# Patient Record
(demographics unavailable — no encounter records)

---

## 2025-03-31 NOTE — PHYSICAL EXAM
[Obese, well nourished, in no acute distress] : obese, well nourished, in no acute distress [Normal] : affect appropriate [de-identified] : Anicteric, no conjunctival injection or drainage noted [de-identified] : Supple, no obvious palpable masses or lymphadenopathy noted [de-identified] : Nonlabored respirations, equal chest rise, no audible wheezing [de-identified] : Regular rate and rhythm [de-identified] : Soft, obese, nontender. [de-identified] : No obvious deformity, normal gait

## 2025-03-31 NOTE — ASSESSMENT
[FreeTextEntry1] : DEBRA LEWIS is a 45 year-old F with a long-standing h/o obesity, who presents today for initial evaluation for weight management options.   Had a lengthy discussion with the patient regarding nutrition, exercise, and weight loss medications. The pt is most interested in weight loss medications at this time.   Health maintenance and behavioral/nutrition counseling for obesity: An additional 30 minutes of the visit was spent counseling the patient regarding need for aggressive weight loss and behavior modification. Patient educated regarding proper nutrition, and how to modify current eating habits noted in HPI. Patient also educated regarding exercise including suggestions of different exercises to try. All questions answered.   Patient will work on the following:  -Eliminate snacks  -Focus on eating 3 well-balanced meals during the daytime with appropriate portion size  -Cooking fresh meals rather than take out/processed/ready-made foods  -Protein focused meals, plenty of vegetables and complex carbohydrates  -Avoiding fried/fatty foods and foods containing high sugar content  -Incorporating exercise (aim for 150 mins/week with both cardio- and strength-training)   ***FEMALES ONLY***  Discussed with the pt of the warnings of pregnancy while on Zepbound:  - instructed pt to avoid planned pregnancy and use contraception; the pt is currently using: Vasectomy  - advised pt to stop Zepbound immediately if becomes pregnant  Pt verbalized understanding of the above.   Start Zepbound based on labs and authorization. Currently there are no contraindications for the use of GLP-1 agonists after reviewing the pt's medical history and labs, including no personal and family history of thyroid cancer or MEN Syndrome. Possible side effects of GLP-1 agonist were discussed with the patient, including nausea, abdominal pain, reflux, constipation, delayed gastric emptying, gallstones, kidney stones, visual changes, and pancreatitis. Pt verbalizes understanding and wishes to proceed with medical therapy. Instructions for use provided. Pt understands the need for long-term use of weight loss medications and understands the risk of weight-gain upon stopping.  Nutrition consult Patient educated to call with questions/concerns All questions answered Return to office 3 weeks after starting Zepbound; call the office right away with any side effects    Additional time spent before and after visit reviewing chart.

## 2025-03-31 NOTE — HISTORY OF PRESENT ILLNESS
[de-identified] : DEBRA LEWIS is a 45 year old F with a long-standing Hx of obesity presents today for initial evaluation for weight management options.   Heaviest/current wt 230/216 lbs, lowest wt 140 lbs. Goal weight: 150 lbs Diets/exercise programs tried in the past: yes Weight loss medications tried in the past: no Reason for stopping weight loss medication if applicable: n/a    History of bariatric surgery: no Obesity comorbidities: HTN, asthma, anxiety/depression Comorbidities improved/resolved: n/a Anti-obesity medication: none Obesity medication side effects: n/a   Reports no personal or family history of medullary thyroid cancer or MEN syndrome Reports no personal history of glaucoma, pancreatitis, gallstones, kidney stones, eating disorders, seizures, current Wellbutrin use, uncontrolled blood pressure, current/recent narcotic or suboxones use. Notes anxiety/depression and is looking for a therapist- currently not on medications Reports no significant nausea/vomiting, abdominal pain, constipation/diarrhea on a regular basis.   Health screenings: Last Colonoscopy: never Last Mammogram: 2025 Last Pap Smear: 2024   Females of Childbearing Age: Plans for future pregnancy: no If yes, when: n/a Form(s) of Contraception: vasectomy Currently breastfeeding: no  Current dietary lifestyle: Breakfast: 2 eggs scrambled Lunch: protein, veggies, rice/tortilla Dinner: chicken, veggies Snacks: tortilla chips, cookies Drinks: water, coffee   Activity Lifestyle: Sleep: 3-4hrs Physical activity/exercise: walking Work: teacher Smoking/ETOH: nonsmoker, social ETOH

## 2025-04-28 NOTE — PHYSICAL EXAM
[Obese, well nourished, in no acute distress] : obese, well nourished, in no acute distress [Normal] : affect appropriate [de-identified] : Anicteric, no conjunctival injection or drainage noted [de-identified] : Supple, no obvious palpable masses or lymphadenopathy noted [de-identified] : Nonlabored respirations, equal chest rise, no audible wheezing [de-identified] : Regular rate and rhythm [de-identified] : Soft, obese, nontender. [de-identified] : No obvious deformity, normal gait

## 2025-04-28 NOTE — ASSESSMENT
[FreeTextEntry1] : DEBRA LEWIS is a 45 year old F with a long-standing h/o obesity, who presents today for follow up for weight management. Tolerating [medication] well with abdominal pain, nausea, diarrhea after first dose only   Last labs: 4/1/2025 Next labs due: 10/25   Reviewed guidelines about nutrition and snacking - protein focused diet with 3 meals/day Continue better food choices Continue daily over the counter MVI Daily zero calorie liquid intake goal of 64 oz/day Use step counter goal of 8-10K steps daily Encouraged to participate in 150mins exercise/week incorporating cardio and strength training - reviewed strength training exercises with pt    Pt verbalizes understanding of the above   Return to office in 3 months Zepbound dose increased to 5 mg x3-month supply Call with questions or any new side effects Prescription sent to pt's pharmacy on file Follow up with nutritionist All questions answered   Additional time spent before and after visit reviewing chart.

## 2025-04-28 NOTE — HISTORY OF PRESENT ILLNESS
[de-identified] : DEBRA LEWIS is a 45 year F with a long-standing h/o obesity, who presents today for follow up after starting anti-obesity medication Zepbound.  Very happy with progress, weight loss since last visit 7 lbs.  Notes some abdominal pain, nausea, and diarrhea after first dose, but not with subsequent doses. No abdominal pain, reflux, nausea, vomiting, constipation or diarrhea on a regular basis. Consuming 3 meals/day with adequate protein intake Drinking 80 oz water per day. Activities include walking 3-5 times/wk. Sleep 4 hrs/night notes she wakes up at 3 AM due to anxiety    History of bariatric surgery: No Anti-obesity medication(s): Zepbound Start date: 4/12/2025 Current dose: 2.5 Mg Side-effects from anti-obesity medication(s): Nausea, abdominal pain, diarrhea after first dose only   Starting weight at initial consult: 216 lbs Current weight at today's visit: 209 lbs Obesity comorbidities: Hypertension, asthma, anxiety/depression Comorbidities improved/resolved: None

## 2025-04-28 NOTE — REVIEW OF SYSTEMS
[Recent Change In Weight] : ~T recent weight change [Negative] : Allergic/Immunologic [FreeTextEntry7] : see HPI

## 2025-07-15 NOTE — HISTORY OF PRESENT ILLNESS
[de-identified] : DEBRA LEWIS is a 45 year F with a long-standing h/o obesity, who presents today for follow up after starting anti-obesity medication Zepbound.  Very happy with progress, weight loss since last visit 2.5months ago- 17 lbs.  Notes minimal reflux, constipation/diarrhea related to certain foods. No abdominal pain, reflux, nausea, vomiting, constipation or diarrhea on a regular basis. Consuming 3 meals/day with adequate protein intake Drinking 60 oz water per day. Activities include walking 3 times/wk. Sleep 5 hrs/night notes she wakes up at 3 AM due to anxiety Notes vision changes with blurriness at night gradually over the past few months. Also notes anterior neck tenderness and right calf tenderness that she has not yet seen PCP regarding Patient also notes muscle weakness and some hair loss   History of bariatric surgery: No Anti-obesity medication(s): Zepbound Start date: 4/12/2025 Current dose: 5 Mg Side-effects from anti-obesity medication(s): mild reflux and diarrhea/constipation, night time blurriness, muscle weakness, hair loss   Starting weight at initial consult: 216 lbs Current weight at today's visit: 192 lbs Obesity comorbidities: Hypertension, asthma, anxiety/depression Comorbidities improved/resolved: None

## 2025-07-15 NOTE — ASSESSMENT
[FreeTextEntry1] : DEBRA LEWIS is a 45 year old F with a long-standing h/o obesity, who presents today for follow up for weight management. Tolerating Zepbound well with mild reflux and constipation/diarrhea   Last labs: 4/1/2025 Next labs due: 10/25   Reviewed guidelines about nutrition and snacking - protein focused diet with 3 meals/day Continue better food choices Continue daily over the counter MVI Daily zero calorie liquid intake goal of 64 oz/day Use step counter goal of 8-10K steps daily Encouraged to participate in 150mins exercise/week incorporating cardio and strength training - reviewed strength training exercises with pt   # hair loss Patient educated to start vitamin supplement with biotin and b complex vitamins Patient also re-educated regarding nutrition and eating 3 well balanced meals without skipping  #muscle weakness, likely 2/2 patient not eating enough protein in the setting of GLP1 agonist and strength training Patient re-educated regarding nutrition and increasing protein intake Recommended she start logging using an chelsea to ensure enough protein intake (minimum of 100g daily given strength training + GLP 1 agonist) Recommended she stop eating protein bars and switch to protein shake to increase amount of protein she is getting. In addition she should get protein at every meal.  #vision changes Recommended she see an ophthalmologist to r/o relation with GLP 1 agonist.  #neck/leg pain Recommended she see her PCP for further workup  Pt verbalizes understanding of the above   Return to office in 1 month Zepbound dose kept at 5mg - will re-evaluate increasing the dose pending eval from optho and PCP, and muscle weakness/hair loss with improved nutrition Meet with nutritionist Call with questions or any new side effects Prescription sent to pt's pharmacy on file Follow up with nutritionist All questions answered   Additional time spent before and after visit reviewing chart.

## 2025-07-15 NOTE — PHYSICAL EXAM
[Obese, well nourished, in no acute distress] : obese, well nourished, in no acute distress [Normal] : affect appropriate [de-identified] : Anicteric, no conjunctival injection or drainage noted [de-identified] : Supple, no obvious palpable masses or lymphadenopathy noted [de-identified] : Nonlabored respirations, equal chest rise, no audible wheezing [de-identified] : Regular rate and rhythm [de-identified] : Soft, obese, nontender. [de-identified] : No obvious deformity, normal gait

## 2025-07-15 NOTE — REVIEW OF SYSTEMS
[Recent Change In Weight] : ~T recent weight change [Negative] : Allergic/Immunologic [Vision Problems] : vision problems [Muscle Weakness] : muscle weakness [FreeTextEntry7] : see HPI